# Patient Record
Sex: MALE | Race: WHITE | URBAN - METROPOLITAN AREA
[De-identification: names, ages, dates, MRNs, and addresses within clinical notes are randomized per-mention and may not be internally consistent; named-entity substitution may affect disease eponyms.]

---

## 2019-12-29 ENCOUNTER — HISTORICAL (OUTPATIENT)
Dept: ADMINISTRATIVE | Facility: HOSPITAL | Age: 84
End: 2019-12-29

## 2019-12-29 LAB
ABS NEUT (OLG): 4.54 X10(3)/MCL (ref 2.1–9.2)
ALBUMIN SERPL-MCNC: 3.4 GM/DL (ref 3.4–5)
ALBUMIN/GLOB SERPL: 0.9 RATIO (ref 1.1–2)
ALP SERPL-CCNC: 83 UNIT/L (ref 50–136)
ALT SERPL-CCNC: 25 UNIT/L (ref 12–78)
APTT PPP: 31.6 SECOND(S) (ref 24.2–33.9)
AST SERPL-CCNC: 15 UNIT/L (ref 15–37)
BASOPHILS # BLD AUTO: 0 X10(3)/MCL (ref 0–0.2)
BASOPHILS NFR BLD AUTO: 0 %
BILIRUB SERPL-MCNC: 0.3 MG/DL (ref 0.2–1)
BILIRUBIN DIRECT+TOT PNL SERPL-MCNC: 0.1 MG/DL (ref 0–0.5)
BILIRUBIN DIRECT+TOT PNL SERPL-MCNC: 0.2 MG/DL (ref 0–0.8)
BUN SERPL-MCNC: 20 MG/DL (ref 7–18)
CALCIUM SERPL-MCNC: 9.2 MG/DL (ref 8.5–10.1)
CHLORIDE SERPL-SCNC: 106 MMOL/L (ref 98–107)
CO2 SERPL-SCNC: 24 MMOL/L (ref 21–32)
CREAT SERPL-MCNC: 0.96 MG/DL (ref 0.7–1.3)
EOSINOPHIL # BLD AUTO: 0.2 X10(3)/MCL (ref 0–0.9)
EOSINOPHIL NFR BLD AUTO: 2 %
ERYTHROCYTE [DISTWIDTH] IN BLOOD BY AUTOMATED COUNT: 18.1 % (ref 11.5–17)
GLOBULIN SER-MCNC: 3.6 GM/DL (ref 2.4–3.5)
GLUCOSE SERPL-MCNC: 189 MG/DL (ref 74–106)
HCT VFR BLD AUTO: 41.1 % (ref 42–52)
HGB BLD-MCNC: 12.9 GM/DL (ref 14–18)
INR PPP: 1 (ref 0–1.3)
LACTATE SERPL-SCNC: 1.7 MMOL/L (ref 0.4–2)
LACTATE SERPL-SCNC: 3.8 MMOL/L (ref 0.4–2)
LYMPHOCYTES # BLD AUTO: 3.4 X10(3)/MCL (ref 0.6–4.6)
LYMPHOCYTES NFR BLD AUTO: 38 %
MCH RBC QN AUTO: 28.5 PG (ref 27–31)
MCHC RBC AUTO-ENTMCNC: 31.4 GM/DL (ref 33–36)
MCV RBC AUTO: 90.7 FL (ref 80–94)
MONOCYTES # BLD AUTO: 0.8 X10(3)/MCL (ref 0.1–1.3)
MONOCYTES NFR BLD AUTO: 9 %
NEUTROPHILS # BLD AUTO: 4.54 X10(3)/MCL (ref 2.1–9.2)
NEUTROPHILS NFR BLD AUTO: 50 %
PLATELET # BLD AUTO: 261 X10(3)/MCL (ref 130–400)
PMV BLD AUTO: 11.1 FL (ref 9.4–12.4)
POTASSIUM SERPL-SCNC: 4.4 MMOL/L (ref 3.5–5.1)
PROT SERPL-MCNC: 7 GM/DL (ref 6.4–8.2)
PROTHROMBIN TIME: 13.4 SECOND(S) (ref 12–14)
RBC # BLD AUTO: 4.53 X10(6)/MCL (ref 4.7–6.1)
SODIUM SERPL-SCNC: 140 MMOL/L (ref 136–145)
WBC # SPEC AUTO: 9.1 X10(3)/MCL (ref 4.5–11.5)

## 2019-12-30 LAB
ABS NEUT (OLG): 2.83 X10(3)/MCL (ref 2.1–9.2)
APTT PPP: 29.3 SECOND(S) (ref 24.2–33.9)
BASOPHILS # BLD AUTO: 0 X10(3)/MCL (ref 0–0.2)
BASOPHILS NFR BLD AUTO: 1 %
BUN SERPL-MCNC: 19 MG/DL (ref 7–18)
CALCIUM SERPL-MCNC: 8.8 MG/DL (ref 8.5–10.1)
CHLORIDE SERPL-SCNC: 109 MMOL/L (ref 98–107)
CHOLEST SERPL-MCNC: 86 MG/DL (ref 0–200)
CHOLEST/HDLC SERPL: 2.2 {RATIO} (ref 0–5)
CO2 SERPL-SCNC: 24 MMOL/L (ref 21–32)
CREAT SERPL-MCNC: 0.68 MG/DL (ref 0.7–1.3)
CREAT/UREA NIT SERPL: 27.9
EOSINOPHIL # BLD AUTO: 0.1 X10(3)/MCL (ref 0–0.9)
EOSINOPHIL NFR BLD AUTO: 1 %
ERYTHROCYTE [DISTWIDTH] IN BLOOD BY AUTOMATED COUNT: 17.6 % (ref 11.5–17)
EST. AVERAGE GLUCOSE BLD GHB EST-MCNC: 237 MG/DL
GLUCOSE SERPL-MCNC: 81 MG/DL (ref 74–106)
HBA1C MFR BLD: 9.9 % (ref 4.2–6.3)
HCT VFR BLD AUTO: 36.5 % (ref 42–52)
HDLC SERPL-MCNC: 39 MG/DL (ref 35–60)
HGB BLD-MCNC: 11.8 GM/DL (ref 14–18)
INR PPP: 1.1 (ref 0–1.3)
LACTATE SERPL-SCNC: 1.1 MMOL/L (ref 0.4–2)
LACTATE SERPL-SCNC: 1.5 MMOL/L (ref 0.4–2)
LDLC SERPL CALC-MCNC: 25 MG/DL (ref 0–129)
LYMPHOCYTES # BLD AUTO: 2.7 X10(3)/MCL (ref 0.6–4.6)
LYMPHOCYTES NFR BLD AUTO: 43 %
MCH RBC QN AUTO: 28.4 PG (ref 27–31)
MCHC RBC AUTO-ENTMCNC: 32.3 GM/DL (ref 33–36)
MCV RBC AUTO: 88 FL (ref 80–94)
MONOCYTES # BLD AUTO: 0.5 X10(3)/MCL (ref 0.1–1.3)
MONOCYTES NFR BLD AUTO: 8 %
NEUTROPHILS # BLD AUTO: 2.83 X10(3)/MCL (ref 2.1–9.2)
NEUTROPHILS NFR BLD AUTO: 46 %
PLATELET # BLD AUTO: 253 X10(3)/MCL (ref 130–400)
PMV BLD AUTO: 10.6 FL (ref 9.4–12.4)
POTASSIUM SERPL-SCNC: 4 MMOL/L (ref 3.5–5.1)
PROTHROMBIN TIME: 13.9 SECOND(S) (ref 12–14)
RBC # BLD AUTO: 4.15 X10(6)/MCL (ref 4.7–6.1)
SODIUM SERPL-SCNC: 142 MMOL/L (ref 136–145)
TRIGL SERPL-MCNC: 109 MG/DL (ref 30–150)
VLDLC SERPL CALC-MCNC: 22 MG/DL
WBC # SPEC AUTO: 6.1 X10(3)/MCL (ref 4.5–11.5)

## 2019-12-31 LAB
ABS NEUT (OLG): 4.13 X10(3)/MCL (ref 2.1–9.2)
ALBUMIN SERPL-MCNC: 3.7 GM/DL (ref 3.4–5)
ALBUMIN/GLOB SERPL: 1 RATIO (ref 1.1–2)
ALP SERPL-CCNC: 95 UNIT/L (ref 50–136)
ALT SERPL-CCNC: 28 UNIT/L (ref 12–78)
AST SERPL-CCNC: 13 UNIT/L (ref 15–37)
BASOPHILS # BLD AUTO: 0 X10(3)/MCL (ref 0–0.2)
BASOPHILS NFR BLD AUTO: 1 %
BILIRUB SERPL-MCNC: 0.5 MG/DL (ref 0.2–1)
BILIRUBIN DIRECT+TOT PNL SERPL-MCNC: 0.2 MG/DL (ref 0–0.5)
BILIRUBIN DIRECT+TOT PNL SERPL-MCNC: 0.3 MG/DL (ref 0–0.8)
BNP BLD-MCNC: 205 PG/ML (ref 0–125)
BUN SERPL-MCNC: 14 MG/DL (ref 7–18)
CALCIUM SERPL-MCNC: 8.9 MG/DL (ref 8.5–10.1)
CHLORIDE SERPL-SCNC: 105 MMOL/L (ref 98–107)
CO2 SERPL-SCNC: 23 MMOL/L (ref 21–32)
CREAT SERPL-MCNC: 0.84 MG/DL (ref 0.7–1.3)
EOSINOPHIL # BLD AUTO: 0.1 X10(3)/MCL (ref 0–0.9)
EOSINOPHIL NFR BLD AUTO: 2 %
ERYTHROCYTE [DISTWIDTH] IN BLOOD BY AUTOMATED COUNT: 18.1 % (ref 11.5–17)
GLOBULIN SER-MCNC: 3.7 GM/DL (ref 2.4–3.5)
GLUCOSE SERPL-MCNC: 201 MG/DL (ref 74–106)
HCT VFR BLD AUTO: 42.2 % (ref 42–52)
HGB BLD-MCNC: 13.3 GM/DL (ref 14–18)
LYMPHOCYTES # BLD AUTO: 3 X10(3)/MCL (ref 0.6–4.6)
LYMPHOCYTES NFR BLD AUTO: 37 %
MCH RBC QN AUTO: 28.1 PG (ref 27–31)
MCHC RBC AUTO-ENTMCNC: 31.5 GM/DL (ref 33–36)
MCV RBC AUTO: 89.2 FL (ref 80–94)
MONOCYTES # BLD AUTO: 0.7 X10(3)/MCL (ref 0.1–1.3)
MONOCYTES NFR BLD AUTO: 9 %
NEUTROPHILS # BLD AUTO: 4.13 X10(3)/MCL (ref 2.1–9.2)
NEUTROPHILS NFR BLD AUTO: 52 %
PLATELET # BLD AUTO: 275 X10(3)/MCL (ref 130–400)
PMV BLD AUTO: 11.4 FL (ref 9.4–12.4)
POTASSIUM SERPL-SCNC: 3.9 MMOL/L (ref 3.5–5.1)
PROT SERPL-MCNC: 7.4 GM/DL (ref 6.4–8.2)
RBC # BLD AUTO: 4.73 X10(6)/MCL (ref 4.7–6.1)
SODIUM SERPL-SCNC: 139 MMOL/L (ref 136–145)
WBC # SPEC AUTO: 8 X10(3)/MCL (ref 4.5–11.5)

## 2022-05-02 NOTE — HISTORICAL [facility] CERNER
This is a historical note converted from [employer]. Formatting and pictures may have been removed.  Please reference [employer] for original formatting and attached multimedia. Admit and Discharge Dates  Admit Date: 12/29/2019  Discharge Date: 01/02/2020  Physicians  Attending Physician - Mich LOVE, Edmund ALVAREZ  Admitting Physician - Mich LOVE, Edmund ALVAREZ  Primary Care Physician - Kimberley Nazario  Discharge Diagnosis  Coronary artery disease?I25.10  Diabetes mellitus?E11.9  Diabetic neuropathy?E11.40  Fall?[id number]  Fall at home?W19.XXXA  Hypertension?I10  Intracranial hemorrhage after injury without loss of consciousness?S06.300A  Peripheral vascular disease?I73.9  Scalp hematoma?S00.03XA  Chronic systolic CHF with EF 25%  Right VIJI/50 to 69%  Normocytic Anemia, stable  HLD  Hx of depression and anxiety  Surgical Procedures  No procedures recorded for this visit.  Immunizations  No immunizations recorded for this visit.  Hospital Course  chief complaint - f/u ICH, syncope  ?   84 year old male whose past medical hx includes CAD s/p CABG, DM 2, HLD, HTN, anxiety. Initially presented to our [facility] ED via EMS as a level 2 trauma following a fall?at home on 12/29/2019. ?Reportedly patient slipped and fell while ambulating with his walker, and hit the back of his head.?Initial GCS 15.?He denied HA, CP, SOB, neck pain, LOC, dizziness, diplopia, weakness.?Of note, pts wife reported he has had 3 falls within the past 6 months and he becomes very confused following the falls. Initial CT head without contrast remarkable for?focal left frontal parafalcine extra-axial hemorrhage, without adjacent mass effect or midline shift. ?CT C-spine without contrast negative for acute abnormality. ?Patient was subsequently admitted to ICU for close monitoring. ?He was evaluated by neurosurgery, no neurosurgical interventions needed at this time. ?Repeat CT of the head this morning revealed stable small left  parafalcine extra-axial hemorrhage. ?Patient has been cleared for downgrade from the ICU on 2019. ?Hospital medicine services have been consulted to assume care of the patient. Life vest received, to f/up with cardiology on out patient  Significant Findings  Labs Last 72 Hours?  ?Chemistry? Hematology/Coagulation?   Sodium Lvl: 139 mmol/L (19 05:15:00) WBC: 8 x10(3)/mcL (19 05:15:00)   Potassium Lvl: 3.9 mmol/L (19 05:15:00) RBC: 4.73 x10(6)/mcL (19 05:15:00)   Chloride: 105 mmol/L (19 05:15:00) Hgb:?13.3 gm/dL?Low (19 05:15:00)   CO2: 23 mmol/L (19 05:15:00) Hct: 42.2 % (19 05:15:00)   Calcium Lvl: 8.9 mg/dL (19 05:15:00) Platelet: 275 x10(3)/mcL (19 05:15:00)   Glucose Lvl:?201 mg/dL?High (19 05:15:00) MCV: 89.2 fL (19 05:15:00)   EA mg/dL (19 11:24:00) MCH: 28.1 pg (19 05:15:00)   BUN: 14 mg/dL (19 05:15:00) MCHC:?31.5 gm/dL?Low (19 05:15:00)   Creatinine: 0.84 mg/dL (19 05:15:00) RDW:?18.1 %?High (19 05:15:00)   eGFR-AA: >60 (19 05:15:00) MPV: 11.4 fL (19 05:15:00)   eGFR-ZECHARIAH: >60 (19 05:15:00) Abs Neut: 4.13 x10(3)/mcL (19 05:15:00)   Bili Total: 0.5 mg/dL (19 05:15:00) Neutro Auto: 52 % (19 05:15:00)   Bili Direct: 0.2 mg/dL (19 05:15:00) Lymph Auto: 37 % (19 05:15:00)   Bili Indirect: 0.3 mg/dL (19 05:15:00) Mono Auto: 9 % (19 05:15:00)   AST:?13 unit/L?Low (19 05:15:00) Eos Auto: 2 % (19 05:15:00)   ALT: 28 unit/L (19 05:15:00) Abs Eos: 0.1 x10(3)/mcL (19 05:15:00)   Alk Phos: 95 unit/L (19 05:15:00) Basophil Auto: 1 % (19 05:15:00)   Total Protein: 7.4 gm/dL (19 05:15:00) Abs Neutro: 4.13 x10(3)/mcL (19 05:15:00)   Albumin Lvl: 3.7 gm/dL (19 05:15:00) Abs Lymph: 3 x10(3)/mcL (19 05:15:00)   Globulin:?3.7 gm/dL?High (19 05:15:00) Abs Mono: 0.7 x10(3)/mcL (19  05:15:00)   A/G Ratio:?1 ratio?Low (19 05:15:00) Abs Baso: 0 x10(3)/mcL (19 05:15:00)   BNP:?205 pg/mL?High (19 11:21:00)    Hgb A1c:?9.9 %?High (19 11:24:00)    Chol: 86 mg/dL (19 11:24:00)    HDL: 39 mg/dL (19 11:24:00)    Tri mg/dL (19 11:24:00)    LDL: 25 mg/dL (19 11:24:00)    Chol/HDL: 2.2 (19 11:24:00)    VLDL: 22 mg/dL (19 11:24:00)    POC CBG:?280 mg/dL?High (20 08:33:00)    POC CBG:?211 mg/dL?High (20 04:59:00)    POC CBG:?370 mg/dL?High (20 19:49:00)    Blood Glucose, POC:?389 mg/dL?High (20 16:48:00)    Blood Glucose, POC:?271 mg/dL?High (19 12:37:00)    ?  tte- EF 25%  Time Spent on discharge  > 30 minutes  Objective  Vitals & Measurements  T:?36.3? ?C (Oral)? TMIN:?36.3? ?C (Oral)? TMAX:?37.1? ?C (Oral)? HR:?71(Peripheral)? RR:?16? BP:?133/71? SpO2:?99%?  Physical Exam  General:?well-developed well-nourished in no acute distress  ??Respiratory:?clear to auscultation bilaterally  ??Cardiovascular:?regular rate and rhythm without murmurs, gallops or rubs, no edema, s1s2 only  GI:?soft, non-tender, non-distended with normal bowel sounds  ??Neurologic: cranial nerves intact, no focal deficits, motor/sensory function intact  Patient Discharge Condition  stable  Discharge Disposition  home   Discharge Medication Reconciliation  Continue  aspirin (Ecotrin)?81 mg, Oral, Daily  carvedilol (Coreg 3.125 mg oral tablet)?3.125 mg, Oral, BIDWMeal  cilostazol (cilostazol 100 mg oral tablet)?100 mg, Oral, BID  empagliflozin (empagliflozin 10 mg oral tablet)?10 mg, Oral, Once  escitalopram (ESCITALOPRAM 20MG TABLETS)?20 mg, Oral, Daily  furosemide (Lasix 40 mg oral tablet)?40 mg, Oral, Daily  glyBURIDE (glyBURIDE 5 mg oral tablet)?5 mg, Oral, BID  lisinopril (lisinopril 5 mg oral tablet)?5 mg, Oral, Daily  metFORMIN (metformin 850 mg oral tablet)?850 mg, Oral, TID  multivitamin with minerals (MVI with Minerals (Adult Tab))?1  tab(s), Oral, Daily  simvastatin (simvastatin 20 mg oral tablet)?20 mg, Oral, Once a day (at bedtime)  Discontinue  LORAzepam?1 mg, Oral, BID  Education and Orders Provided  Cardiac Rehabilitation  Type 2 Diabetes Mellitus, Diagnosis, Adult, Easy-to-Read  Fall Prevention and Home Safety, [facility]  Intracerebral Hemorrhage  Hypertension, Easy-to-Read  Peripheral Vascular Disease, Easy-to-Read  Coronary Artery Disease, Male  Diabetic Neuropathy  Discharge - 01/02/20 10:22:00 CST, Home?  Follow up  Kimberley Nazario  Follow up with Arranged Physician

## 2022-05-02 NOTE — HISTORICAL [facility] CERNER
This is a historical note converted from [employer]. Formatting and pictures may have been removed.  Please reference Keturah for original formatting and attached multimedia. Chief Complaint  pt to ER via [facility] for fall. ?level 2 trauma, see paper charting  History of Present Illness  Mr. Kwon is a 84 year old male whose past medical hx includes CAD s/p CABG, DM 2, HLD, HTN, anxiety. Initially presented to our [facility] ED via EMS as a level 2 trauma following a fall?at home on 12/29/2019. ?Reportedly patient slipped and fell while ambulating with his walker, and hit the back of his head.?Initial GCS 15.?He denied HA, CP, SOB, neck pain, LOC, dizziness, diplopia, weakness.?Of note, pts wife reported he has had 3 falls within the past 6 months and he becomes very confused following the falls. Initial CT head without contrast remarkable for?focal left frontal parafalcine extra-axial hemorrhage, without adjacent mass effect or midline shift. ?CT C-spine without contrast negative for acute abnormality. ?Patient was subsequently admitted to ICU for close monitoring. ?He was evaluated by neurosurgery, no neurosurgical interventions needed at this time. ?Repeat CT of the head this morning revealed stable small left parafalcine extra-axial hemorrhage. ?Patient has been cleared for downgrade from the ICU on 12/30/2019. ?Hospital medicine services have been consulted to assume care of the patient.  Review of Systems  Except as documented, all other systems reviewed and negative.?  Physical Exam  Vitals & Measurements  T:?36.6? ?C (Oral)? TMIN:?36.6? ?C (Oral)? TMAX:?36.8? ?C (Oral)? HR:?73(Peripheral)? HR:?73(Monitored)? RR:?21? BP:?134/69? SpO2:?96%? WT:?80.4?kg? BMI:?26.25?  General:?Cooperative; in no acute distress  Eye: PERRLA, EOMI, clear conjunctiva, eyelids normal  HENT:?normocephalic; atraumatic; hearing grossly intact  Neck: full range of motion, No JVD  Respiratory:?clear to auscultation bilaterally; non-labored  respirations; symmetrical chest expansion; no wheezes, rales, or rhonchi  Cardiovascular:?regular rate and rhythm; no edema noted  Gastrointestinal:?soft, non-tender, non-distended with normal bowel sounds; no rebound tenderness or guarding  Musculoskeletal:?moves all extremities; normal ROM, no deformities noted  Integumentary:?Small?hematoma, abrasion?to posterior occipital region; abrasion?to left?elbow?with surrounding erythema.  Neurologic: Alert and oriented; motor/sensory function intact; 5/5 strength BLE  ?  Assessment/Plan  Parafalcine?ICH?  s/p fall  Posterior scalp hematoma secondary to above  Essential HTN  NIDDM, type 2  Normocytic Anemia, stable  CAD s/p CABG  HLD  Hx of depression and anxiety  ?   Plan:  Follow neurosurgery recommendations.  Check hemoglobin a1c, lipid panel  Although pt denied syncopal episode will check B Carotid US and ECHO as pt has had several falls over the past 6 months.  Continue PT/OT  Continue CBGs ac/hs with ISS #2  Fall Precautions  Neuro checks q 4 hrs  PRN analgesics and antiemetics  Resume home medications as deemed appropriate  Labs in AM  ?   Source of history: Self. Spouse. Medical record.?  Present at bedside: Spouse, Son.  History limitation: None.  Provider information: PCP:?Kimberley Chavira NP  ?   Code status: Full  DVT prophylaxis:SCDs bilaterally  ?  ?   I, Christi Wong NP reviewed and discussed this case with Dr. JUSTIN Hunter.  ?   Problem List/Past Medical History  Ongoing  Anxiety  CAD - Coronary artery disease  Diabetes mellitus type II  Diabetic neuropathy  Dyspnea on exertion  Exertional angina  Hyperlipidemia  Hypertension  Historical  Triple vessel disease of the heart  Procedure/Surgical History  Bypass CABG (.) (09/12/2016)  Bypass Coronary Artery, One Site from Left Internal Mammary, Open Approach (09/12/2016)  Bypass Coronary Artery, Two Sites from Aorta with Autologous Venous Tissue, Open Approach (09/12/2016)  Excision of Left Greater Saphenous  Vein, Percutaneous Endoscopic Approach (09/12/2016)  Performance of Cardiac Output, Continuous (09/12/2016)  Cardiac catheterization, left heart (09/06/2016)  Right hip replacement (02/22/2016)  Left hip replacement (2008)  Circumcision (1996)  CABG x 3 - Coronary artery bypass grafts x 3  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  Left foot surgery for dropfoot  Tonsillectomy   Medications  Inpatient  Coreg 3.125 mg oral tablet, 3.125 mg= 1 tab(s), Oral, BIDWMeal  Dextrose 10% in Water intravenous solution, 125 mL, IV, As Directed, PRN  Dextrose 10% in Water intravenous solution, 125 mL, IV, As Directed, PRN  Dextrose 10% in Water intravenous solution, 125 mL, IV, Once, PRN  Dextrose 10% in Water intravenous solution, 250 mL, IV, As Directed, PRN  escitalopram 10 mg oral tablet, 20 mg= 2 tab(s), Oral, Daily  glucagon, 1 mg= 1 EA, IM, q10min, PRN  glucagon, 1 mg= 1 EA, IM, q10min, PRN  insulin lispro, 2-14 units, Subcutaneous, As Directed, PRN  Lasix 40 mg oral tablet, 40 mg= 1 tab(s), Oral, Daily  lisinopril 5 mg oral tablet, 5 mg= 1 tab(s), Oral, Daily  morphine, 1 mg= 0.25 mL, IV Push, q1hr, PRN  Protonix 40 mg Vial (IV Push), 40 mg= 1 EA, IV Slow, Daily  simvastatin 20 mg oral tablet, 20 mg= 1 tab(s), Oral, Once a day (at bedtime)  Sodium Chloride 0.9% 1000mL 1,000 mL, 1000 mL, IV  Zofran INJ. (IV Push / IM) 2 mg/mL, 4 mg= 2 mL, IV Push, q4hr, PRN  Home  cilostazol 100 mg oral tablet, 100 mg= 1 tab(s), Oral, BID  Coreg 3.125 mg oral tablet, 3.125 mg= 1 tab(s), Oral, BIDWMeal  Ecotrin, 81 mg= 1 tab(s), Oral, Daily  empagliflozin 10 mg oral tablet, 10 mg= 1 tab(s), Oral, Once  ESCITALOPRAM 20MG TABLETS, 20 mg= 1 tab(s), Oral, Daily  glyBURIDE 5 mg oral tablet, 5 mg= 1 tab(s), Oral, BID  Lasix 40 mg oral tablet, 40 mg= 1 tab(s), Oral, Daily,? 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 Home equipment: Glucose monitoring., 09/07/2016  Substance Use  Never, 09/07/2016  Tobacco  Never (less than 100 in lifetime), N/A, 12/29/2019  Family History  Cancer: Mother and Father.  Heart disease.: Father.  Hypertension.: Father.  Lab Results  Labs Last 24 Hours?  ?Chemistry? Hematology/Coagulation?   Sodium Lvl: 142 mmol/L (12/30/19 01:52:00) PT: 13.9 second(s) (12/30/19 01:52:00)   Potassium Lvl: 4 mmol/L (12/30/19 01:52:00) INR: 1.1 (12/30/19 01:52:00)   Chloride:?109 mmol/L?High (12/30/19 01:52:00) PTT: 29.3 second(s) (12/30/19 01:52:00)   CO2: 24 mmol/L (12/30/19 01:52:00) WBC: 6.1 x10(3)/mcL (12/30/19 01:52:00)   Calcium Lvl: 8.8 mg/dL (12/30/19 01:52:00) RBC:?4.15 x10(6)/mcL?Low (12/30/19 01:52:00)   Glucose Lvl: 81 mg/dL (12/30/19 01:52:00) Hgb:?11.8 gm/dL?Low (12/30/19 01:52:00)   BUN:?19 mg/dL?High (12/30/19 01:52:00) Hct:?36.5 %?Low (12/30/19 01:52:00)   Creatinine:?0.68 mg/dL?Low (12/30/19 01:52:00) Platelet: 253 x10(3)/mcL (12/30/19 01:52:00)   BUN/Creat Ratio: 27.9 (12/30/19 01:52:00) MCV: 88 fL (12/30/19 01:52:00)   eGFR-AA: >60 (12/30/19 01:52:00) MCH: 28.4 pg (12/30/19 01:52:00)   eGFR-ZECHARIAH: >60 (12/30/19 01:52:00) MCHC:?32.3 gm/dL?Low (12/30/19 01:52:00)   Bili Total: 0.3 mg/dL (12/29/19 15:27:00) RDW:?17.6 %?High (12/30/19 01:52:00)   Bili Direct: 0.1 mg/dL (12/29/19 15:27:00) MPV: 10.6 fL (12/30/19 01:52:00)   Bili Indirect: 0.2 mg/dL (12/29/19 15:27:00) Abs Neut: 2.83 x10(3)/mcL (12/30/19 01:52:00)   AST: 15 unit/L (12/29/19 15:27:00) Neutro Auto: 46 % (12/30/19 01:52:00)   ALT: 25 unit/L (12/29/19 15:27:00)  Lymph Auto: 43 % (12/30/19 01:52:00)   Alk Phos: 83 unit/L (12/29/19 15:27:00) Mono Auto: 8 % (12/30/19 01:52:00)   Total Protein: 7 gm/dL (12/29/19 15:27:00) Eos Auto: 1 % (12/30/19 01:52:00)   Albumin Lvl: 3.4 gm/dL (12/29/19 15:27:00) Abs Eos: 0.1 x10(3)/mcL (12/30/19 01:52:00)   Globulin:?3.6 gm/dL?High (12/29/19 15:27:00) Basophil Auto: 1 % (12/30/19 01:52:00)   A/G Ratio:?0.9 ratio?Low (12/29/19 15:27:00) Abs Neutro: 2.83 x10(3)/mcL (12/30/19 01:52:00)   Lactic Acid Lvl: 1.5 mmol/L (12/30/19 08:42:00) Abs Lymph: 2.7 x10(3)/mcL (12/30/19 01:52:00)    Abs Mono: 0.5 x10(3)/mcL (12/30/19 01:52:00)    Abs Baso: 0 x10(3)/mcL (12/30/19 01:52:00)   Diagnostic Results  Accession:?[id number]  Order:?CT Head W/O Contrast  Report Dt/Tm:?12/30/2019 07:50  Report:?  EXAM: CT HEAD WITHOUT CONTRAST  ?  History: Trauma  ?  Comparison studies: 12/29/2019  ?  Technique:?  Axial scans were obtained from skull base to the vertex.  Coronal and sagittal reconstructions obtained from the axial data.?  Automatic exposure control was utilized to limit radiation dose.  Contrast: None  ?  Radiation Dose:  Total DLP: 852 mGy*cm  ?  DISCUSSION:  ?  There is similar size of small extra-axial hemorrhage along the left  anterior falx measuring up to 5 mm in thickness. There is no new or  progressive intracranial hemorrhage. The brain parenchyma is  unchanged.  ?  There is no mass effect or midline shift. The ventricles and sulci are  stable in size. The basal cisterns are patent. There are  calcifications in the carotid siphons.  ?  The calvarium and skull base are intact. The visualized paranasal  sinuses and the mastoid air cells are clear.  ?  ?  IMPRESSION:  Stable small left parafalcine extra-axial hemorrhage.  ?  No significant change from the [facility] interpretation.  ?  ?  Accession:?[id number]  Order:?CT Cervical Spine W/O Contrast  Report Dt/Tm:?12/29/2019 16:07  Report:?  EXAMINATION  CT Cervical Spine W/O  Contrast  ?  TECHNIQUE  ?? ? Helical-acquisition CT images were obtained without the  intravenous administration of iodine-based contrast media.  ?? ? Multiplanar reformats of the cervical spine were accomplished by  a CT technologist at a separate workstation and pushed to PACS for  physician review.  ?  Total DLP (mGy-cm): 593  (value may include radiation due to concomitantly performed CT  imaging)  ?? ? Automated tube current modulation and/or weight-based exposure  dosing is utilized, when appropriate, to reach lowest reasonably  achievable exposure rate.  ?  INDICATION  Trauma due to recent fall  ?  Comparison: No similar modality comparison is available.  ?  FINDINGS  Images were reviewed in bone, soft tissue, and lung windows.  ?  Exam quality: adequate  Absence of intravenous contrast limits assessment of the visualized  soft tissues and vascular structures.  ?  No evidence of acute cortical displacement, vertebral body  compression, or traumatic subluxation is identified.  ?  There are advanced degenerative endplate and facet changes throughout  the cervical levels, with chronic-appearing grade 1 C3-4 and C4-5  anterolisthesis noted.  Notable C5-6 and C6-7 intervertebral narrowing is present.  ?  No evidence of acute spinal canal contour irregularity or stenosis is  identified.  Moderate bilateral osseous foraminal narrowing is present at multiple  levels, most significant C4-5 and C5-6.  ?  The prevertebral soft tissues are unremarkable.  Scattered vascular calcifications are present.  The remaining osseous structures demonstrate no acute or focal  abnormality.  ?  IMPRESSION  1. ?No acute or focal cervical spine disruption.?  2. ?Multilevel advanced degenerative changes.  ?  ?  Accession:?[id number]  Order:?CT Head W/O Contrast  Report Dt/Tm:?12/29/2019 16:04  Report:?  EXAMINATION  CT Head W/O Contrast  ?  TECHNIQUE  ?? ? Axial CT images were acquired without the intravenous  administration of  iodine-based contrast media.  ?? ? Multiplanar reconstructions were accomplished by a CT  technologist at a separate workstation and pushed to PACS for  physician review.  ?  Total DLP (mGy-cm): 1176  (value may include radiation due to concomitantly performed CT  imaging)  ?? ? Automated tube current modulation and/or weight-based exposure  dosing is utilized, when appropriate, to reach lowest reasonably  achievable exposure rate.  ?  INDICATION  Head injury due to recent fall  ?  Comparison: No similar modality comparisons are available at the time  of exam interpretation.  ?  FINDINGS  Images were reviewed in subdural, brain, soft tissue, and bone  windows.  ?  Exam quality: Motion/streak artifact limits assessment of the  posterior fossa.  ?  ?  ?  Extra-axial spaces: There is focal left frontal parafalcine  hyperattenuation suspicious for acute extra-axial hematoma measuring  up to approximately 7 mm in thickness. No other abnormal extra-axial  fluid is identified. There is no associated mass effect.  Ventricles: Normal configuration. Age-appropriate volume, without  evidence for hydrocephalus.  Brain sulci: Diffusely increased volume, likely appropriate appearance  for patient age.  Midline shift: None appreciated.  ?  Parenchyma:  ?? ? There is diffuse white matter hypoattenuation, typical of chronic  microvascular disease.  ?? ? No mass or mass effect, intraparenchymal hemorrhage, or  noncontrast CT evidence of large acute vascular insult. ? ?  ?? ? Differentiation of the gray-white border is grossly preserved.  ?  Posterior fossa: Unremarkable.  Sella/Suprasellar region: No abnormalities.  ?  Vasculature: No focally hyperdense artery or vein is visualized.  Scattered vascular calcifications are present.  Dural sinuses: No focal abnormality.  ?  Scalp/Skull: No abnormalities.  Skull base: Mastoid air cells are well aerated. No focal abnormality.  ?  Facial structures: No acutely displaced  fracture.  Paranasal sinuses: Clear, with no fluid level.  ?  IMPRESSION  1. ?Suspected focal left frontal parafalcine extra-axial hemorrhage,  without adjacent mass effect or midline shift.  2. ?No other evidence of acute intracranial abnormality is identified.  3. ?Chronic, age-related changes described above.  ?  ?  =========================  Critical Result: Cerebral hemorrhage  ?  Dr. Madsen (Radiology) provided a verbal report of the above findings  to Dr. Pinzon (Emergency Dept), via telephone, at the time of exam  interpretation/dictation (12/29/2019 4:02 PM CST).  ?  ?      For this patient encounter, I reviewed the NPs documentation, treatment plan, and medical decision making. ?I had face-to-face time with, and assumed care?this patient 12/30/19. ?[TWA]  84 year old male with a history of CAD on ASA presents s/p fall from ground level on 12/29.? Patient did hit back of head during fall.? On arrival GCS 15, but CT head noted small left sided parafalcine hemorrhage.? He was admitted to ICU, seen by neurosurgery, but no intervention warranted.? Patient without deficits, and repeat CT head next day noted stable bleed.? Patient downgraded to care of hospital medicine.? Continue with PT/OT, likely needs some sort of placement for physical therapy.

## 2022-05-02 NOTE — HISTORICAL [facility] CERNER
This is a historical note converted from [place]. Formatting and pictures may have been removed.  Please reference Cert[facility] for original formatting and attached multimedia. Chief Complaint  pt to ER via [facility] for fall. ?level 2 trauma, see paper charting  History of Present Illness  84M with hx of CABGx3, DM, HLD, anxiety, depression presents after GLF with findings of a parafalcine hemorrhage. He was a level II trauma activation. No other injuries identified. On exam complains of some posterior head pain. Neuro intact. GCS15. The patient states he does not remember his fall, but wife heard the fall and called EMS. Was drowsy initially per report and answering questions inappropriately.  Review of Systems  Negative unless otherwise stated  Physical Exam  Vitals & Measurements  T:?36.8? ?C (Oral)? HR:?86(Peripheral)? HR:?86(Monitored)? RR:?22? BP:?111/62? SpO2:?96%?  Gen: NAD, lying comfortably  HEENT: 2cm hematoma on the posterior head  CV: pulses intact distally, RR  Pulm: normal effort no accessory muscle use  Abd: soft nt nd  MSK: no wounds  Neuro: GCS 15, AAOx4, PATEL, strength equal in the BUE and BLE, sensation intact in the BUE and BLE  Assessment/Plan  A: 84M?hx of CAD s/p CABGx3, DM, anxiety, depression, HLD presents after ground level fall?with scalp hematoma and parafalcine hemorrhage  ?  P:  admit to ICU  q1 neurochecks  NSGY consult  repeat CTH in am  miVF  SSI  home meds, hold ativan  lovenox when able per nsgy   Problem List/Past Medical History  Ongoing  Anxiety  CAD - Coronary artery disease  Diabetes mellitus type II  Diabetic neuropathy  Dyspnea on exertion  Exertional angina  Hyperlipidemia  Hypertension  Historical  Anxiety  Diabetes  Exertional angina  Hyperlipidaemia  Hypertension  Neuropathy  Triple vessel disease of the heart  Procedure/Surgical History  Bypass CABG (.) (09/12/2016)  Bypass Coronary Artery, One Site from Left Internal Mammary, Open Approach (09/12/2016)  Bypass Coronary Artery,  Two Sites from Aorta with Autologous Venous Tissue, Open Approach (09/12/2016)  Excision of Left Greater Saphenous Vein, Percutaneous Endoscopic Approach (09/12/2016)  Performance of Cardiac Output, Continuous (09/12/2016)  Cardiac catheterization, left heart (09/06/2016)  Right hip replacement (02/22/2016)  Left hip replacement (2008)  Circumcision (1996)  CABG x 3 - Coronary artery bypass grafts x 3  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  Left foot surgery for dropfoot  Tonsillectomy   Medications  Inpatient  Coreg 3.125 mg oral tablet, 3.125 mg= 1 tab(s), Oral, BIDWMeal  Dextrose 10% in Water intravenous solution, 125 mL, IV, As Directed, PRN  Dextrose 10% in Water intravenous solution, 125 mL, IV, As Directed, PRN  Dextrose 10% in Water intravenous solution, 125 mL, IV, Once, PRN  Dextrose 10% in Water intravenous solution, 250 mL, IV, As Directed, PRN  escitalopram 10 mg oral tablet, 20 mg= 2 tab(s), Oral, Daily  glucagon, 1 mg= 1 EA, IM, q10min, PRN  glucagon, 1 mg= 1 EA, IM, q10min, PRN  insulin lispro, 2-14 units, Subcutaneous, As Directed, PRN  Lasix 40 mg oral tablet, 40 mg= 1 tab(s), Oral, Daily  lisinopril 5 mg oral tablet, 5 mg= 1 tab(s), Oral, Daily  morphine, 1 mg= 0.25 mL, IV Push, q1hr, PRN  Protonix 40 mg Vial (IV Push), 40 mg= 1 EA, IV Slow, Daily  simvastatin 20 mg oral tablet, 20 mg= 1 tab(s), Oral, Once a day (at bedtime)  Sodium Chloride 0.9% 1000mL 1,000 mL, 1000 mL, IV  Zofran INJ. (IV Push / IM) 2 mg/mL, 4 mg= 2 mL, IV Push, q4hr, PRN  Home  Coreg 3.125 mg oral tablet, 3.125 mg= 1 tab(s), Oral, BIDWMeal  Ecotrin, 81 mg= 1 tab(s), Oral, Daily  ESCITALOPRAM 20MG TABLETS, 20 mg= 1 tab(s), Oral, Daily  glyBURIDE 5 mg oral tablet, 5 mg= 1 tab(s), Oral, BID  Lasix 40 mg oral tablet, 40 mg= 1 tab(s), Oral, Daily  lisinopril 5 mg oral tablet, 5 mg= 1 tab(s), Oral, Daily  metformin 850 mg oral tablet, 850 mg= 1 tab(s), Oral, TID  MVI with Minerals (Adult Tab), 1 tab(s), Oral, Daily  Percocet 5/325, 1  tab(s), Oral, q4hr, PRN  simvastatin 20 mg oral tablet, 20 mg= 1 tab(s), Oral, Once a day (at bedtime)  Allergies  Ambien?(hallucinations)  Norco?(hallucinations)  morphine  Social History  Alcohol  Past, 09/07/2016  Employment/School  Retired, 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 Home equipment: Glucose monitoring., 09/07/2016  Substance Use  Never, 09/07/2016  Tobacco  Never smoker, 09/07/2016  Family History  Cancer: Mother and Father.  Heart disease.: Father.  Hypertension.: Father.      Agree w above. Admit to ICU and consult NSGY.